# Patient Record
Sex: FEMALE | Race: WHITE | URBAN - METROPOLITAN AREA
[De-identification: names, ages, dates, MRNs, and addresses within clinical notes are randomized per-mention and may not be internally consistent; named-entity substitution may affect disease eponyms.]

---

## 2022-05-25 PROBLEM — R35.1 NOCTURIA: Status: ACTIVE | Noted: 2022-05-25

## 2024-06-19 ENCOUNTER — OFFICE VISIT (OUTPATIENT)
Age: 74
End: 2024-06-19
Payer: MEDICARE

## 2024-06-19 VITALS — WEIGHT: 191 LBS | HEIGHT: 63 IN | BODY MASS INDEX: 33.84 KG/M2

## 2024-06-19 DIAGNOSIS — N39.0 RECURRENT UTI: Primary | ICD-10-CM

## 2024-06-19 DIAGNOSIS — Z85.51 HISTORY OF BLADDER CANCER: ICD-10-CM

## 2024-06-19 DIAGNOSIS — R35.1 NOCTURIA: ICD-10-CM

## 2024-06-19 LAB
BILIRUBIN, POC: NORMAL
BLOOD URINE, POC: NORMAL
CLARITY, POC: NORMAL
COLOR, POC: YELLOW
GLUCOSE URINE, POC: NORMAL
KETONES, POC: NORMAL
LEUKOCYTE EST, POC: NORMAL
NITRITE, POC: NORMAL
PH, POC: NORMAL
PROTEIN, POC: NORMAL
SPECIFIC GRAVITY, POC: NORMAL
UROBILINOGEN, POC: NORMAL

## 2024-06-19 PROCEDURE — 1123F ACP DISCUSS/DSCN MKR DOCD: CPT | Performed by: SPECIALIST

## 2024-06-19 PROCEDURE — 99214 OFFICE O/P EST MOD 30 MIN: CPT | Performed by: SPECIALIST

## 2024-06-19 PROCEDURE — 81003 URINALYSIS AUTO W/O SCOPE: CPT | Performed by: SPECIALIST

## 2024-06-19 RX ORDER — NITROFURANTOIN 25; 75 MG/1; MG/1
100 CAPSULE ORAL 2 TIMES DAILY
Qty: 14 CAPSULE | Refills: 2 | Status: SHIPPED | OUTPATIENT
Start: 2024-06-19

## 2024-06-19 NOTE — PROGRESS NOTES
Dagoberto Gillette MD [facility] Urology Office Progress Note    Patient:  Rosalie Garcia  YOB: 1950  Date: 6/19/2024    HISTORY OF PRESENT ILLNESS:   The patient is a 73 y.o. female  The patient presents with a history of recurrent UTI's.  Urine for C&S each and every time patient thinks she has a UTI.   Patient given an Rx for Macrobid to have on hand in case of infection.   Continue Cranberry pills po bid for UTI prevention.   Patient has nocturia x 3.  Patient instructed to limit fluid intake 2-3 hours prior to bedtime to minimize nocturia or nocturnal incontinence.   Lower urinary tract symptoms: urgency, frequency, hesitancy, decreased urinary stream, nocturia, 3 times per night, and incomplete emptying and straining.   Last AUA Symptom Score (QOL): 17 (1)  Today's AUA Symptom Score (QOL): 22 (2)    Summary of old records:   Recurrent UTI\"s: Cranberry pills po bid, Macrobid on hand, 6/13/22 cysto: neg   History of bladder cancer: TaG2 5/17/07, last cysto 7/31/13  Mild urge incontinence: 1 minipad ppd; resolved 6/15/23  Nocturia x 4; 6/2/22 VD: LDX=837 mL, GCY=0444 mL/d, TV=8.66 voids/d, NTA=702 mL/void, Nx1.33, NPi=30%, NUP=38 mL/hr   Bladder atony with minimal PVR 11/7/16    Additional History: none    Procedures Today: N/A    Urinalysis today:  Results for POC orders placed in visit on 06/19/24   POCT Urinalysis No Micro (Auto)   Result Value Ref Range    Color, UA yellow     Clarity, UA cloudy     Glucose, UA POC neg     Bilirubin, UA      Ketones, UA      Spec Grav, UA      Blood, UA POC trace-intact     pH, UA      Protein, UA POC neg     Urobilinogen, UA      Leukocytes, UA small     Nitrite, UA neg        Last BUN and creatinine:  No results found for: \"BUN\"  No results found for: \"CREATININE\"    Last CBC:  No results found for: \"WBC\", \"HGB\", \"HCT\", \"MCV\", \"PLT\"    Additional Lab/Culture results: none    Imaging Reviewed during this Office Visit: none  (results